# Patient Record
Sex: MALE | Race: WHITE | Employment: OTHER | URBAN - METROPOLITAN AREA
[De-identification: names, ages, dates, MRNs, and addresses within clinical notes are randomized per-mention and may not be internally consistent; named-entity substitution may affect disease eponyms.]

---

## 2017-09-05 ENCOUNTER — HOSPITAL ENCOUNTER (OUTPATIENT)
Dept: LAB | Age: 67
Discharge: HOME | End: 2017-09-05
Payer: MEDICARE

## 2017-09-05 ENCOUNTER — HOSPITAL ENCOUNTER (OUTPATIENT)
Dept: ULTRASOUND IMAGING | Age: 67
Discharge: HOME | End: 2017-09-05
Payer: MEDICARE

## 2017-09-05 DIAGNOSIS — R14.0 ABDOMINAL BLOATING: ICD-10-CM

## 2017-09-05 DIAGNOSIS — R10.11 RUQ PAIN: ICD-10-CM

## 2017-09-05 LAB
ALBUMIN SERPL-MCNC: 4.4 G/DL (ref 3.9–4.9)
ALP BLD-CCNC: 59 U/L (ref 35–104)
ALT SERPL-CCNC: 29 U/L (ref 0–41)
AMYLASE: 62 U/L (ref 28–100)
ANION GAP SERPL CALCULATED.3IONS-SCNC: 18 MEQ/L (ref 7–13)
AST SERPL-CCNC: 27 U/L (ref 0–40)
BILIRUB SERPL-MCNC: 1 MG/DL (ref 0–1.2)
BUN BLDV-MCNC: 20 MG/DL (ref 8–23)
CALCIUM SERPL-MCNC: 9 MG/DL (ref 8.6–10.2)
CHLORIDE BLD-SCNC: 102 MEQ/L (ref 98–107)
CO2: 22 MEQ/L (ref 22–29)
CREAT SERPL-MCNC: 0.67 MG/DL (ref 0.7–1.2)
GFR AFRICAN AMERICAN: >60
GFR NON-AFRICAN AMERICAN: >60
GLOBULIN: 1.9 G/DL (ref 2.3–3.5)
GLUCOSE BLD-MCNC: 53 MG/DL (ref 74–109)
HCT VFR BLD CALC: 47 % (ref 42–52)
HEMOGLOBIN: 15.8 G/DL (ref 14–18)
LIPASE: 34 U/L (ref 13–60)
MCH RBC QN AUTO: 28.4 PG (ref 27–31.3)
MCHC RBC AUTO-ENTMCNC: 33.6 % (ref 33–37)
MCV RBC AUTO: 84.5 FL (ref 80–100)
PDW BLD-RTO: 15.4 % (ref 11.5–14.5)
PLATELET # BLD: 128 K/UL (ref 130–400)
POTASSIUM SERPL-SCNC: 4 MEQ/L (ref 3.5–5.1)
RBC # BLD: 5.56 M/UL (ref 4.7–6.1)
SODIUM BLD-SCNC: 142 MEQ/L (ref 132–144)
T4 FREE: 1.24 NG/DL (ref 0.93–1.7)
TOTAL PROTEIN: 6.3 G/DL (ref 6.4–8.1)
TSH SERPL DL<=0.05 MIU/L-ACNC: 4.12 UIU/ML (ref 0.27–4.2)
WBC # BLD: 7.1 K/UL (ref 4.8–10.8)

## 2017-09-05 PROCEDURE — 85027 COMPLETE CBC AUTOMATED: CPT

## 2017-09-05 PROCEDURE — 84439 ASSAY OF FREE THYROXINE: CPT

## 2017-09-05 PROCEDURE — 84443 ASSAY THYROID STIM HORMONE: CPT

## 2017-09-05 PROCEDURE — 82150 ASSAY OF AMYLASE: CPT

## 2017-09-05 PROCEDURE — 76705 ECHO EXAM OF ABDOMEN: CPT

## 2017-09-05 PROCEDURE — 80053 COMPREHEN METABOLIC PANEL: CPT

## 2017-09-05 PROCEDURE — 83690 ASSAY OF LIPASE: CPT

## 2017-09-26 ENCOUNTER — HOSPITAL ENCOUNTER (OUTPATIENT)
Dept: GENERAL RADIOLOGY | Age: 67
Discharge: HOME | End: 2017-09-26
Payer: MEDICARE

## 2017-09-26 DIAGNOSIS — R05.9 COUGH: ICD-10-CM

## 2017-09-26 PROCEDURE — 71020 XR CHEST STANDARD TWO VW: CPT

## 2017-09-28 ENCOUNTER — OFFICE VISIT (OUTPATIENT)
Dept: PULMONOLOGY | Age: 67
End: 2017-09-28

## 2017-09-28 VITALS
HEART RATE: 75 BPM | TEMPERATURE: 97.8 F | DIASTOLIC BLOOD PRESSURE: 72 MMHG | SYSTOLIC BLOOD PRESSURE: 116 MMHG | HEIGHT: 74 IN | WEIGHT: 272 LBS | OXYGEN SATURATION: 92 % | BODY MASS INDEX: 34.91 KG/M2

## 2017-09-28 DIAGNOSIS — R05.8 UPPER AIRWAY COUGH SYNDROME: ICD-10-CM

## 2017-09-28 DIAGNOSIS — K21.9 GASTROESOPHAGEAL REFLUX DISEASE WITHOUT ESOPHAGITIS: ICD-10-CM

## 2017-09-28 DIAGNOSIS — G47.33 OBSTRUCTIVE SLEEP APNEA SYNDROME: Primary | ICD-10-CM

## 2017-09-28 DIAGNOSIS — R05.3 CHRONIC COUGH: ICD-10-CM

## 2017-09-28 PROCEDURE — 99214 OFFICE O/P EST MOD 30 MIN: CPT | Performed by: PHYSICIAN ASSISTANT

## 2017-09-28 RX ORDER — TRIAMCINOLONE ACETONIDE 55 UG/1
2 SPRAY, METERED NASAL DAILY
COMMUNITY

## 2017-09-28 RX ORDER — OLOPATADINE HYDROCHLORIDE 665 UG/1
2 SPRAY NASAL 2 TIMES DAILY
COMMUNITY

## 2017-09-28 ASSESSMENT — ENCOUNTER SYMPTOMS
RHINORRHEA: 0
WHEEZING: 0
COUGH: 0
SINUS PRESSURE: 1
STRIDOR: 0
HEMOPTYSIS: 0
ABDOMINAL PAIN: 0
SHORTNESS OF BREATH: 1
SORE THROAT: 1
ORTHOPNEA: 0
SPUTUM PRODUCTION: 0
VOMITING: 0
SWOLLEN GLANDS: 0
BACK PAIN: 0

## 2017-09-29 ENCOUNTER — TELEPHONE (OUTPATIENT)
Dept: PULMONOLOGY | Age: 67
End: 2017-09-29

## 2017-09-29 NOTE — TELEPHONE ENCOUNTER
Patient was in to see you on 09/28/2017 and thought you had said that he needed to do some type of pulmonary test but it wasn't ordered. Do you want him to other testing?  Please Advise

## 2017-09-29 NOTE — TELEPHONE ENCOUNTER
Please call patient and let patient know I reviewed with Dr. Giana Bustos. Since PFT's were normal in 2015 there is no absolute need to repeat them at this time, unless the patient would like them repeated I can order.     Kenneth Roberts

## 2017-09-30 ENCOUNTER — HOSPITAL ENCOUNTER (OUTPATIENT)
Dept: NON INVASIVE DIAGNOSTICS | Age: 67
Discharge: HOME | End: 2017-09-30
Payer: MEDICARE

## 2017-09-30 LAB
LV EF: 55 %
LVEF MODALITY: NORMAL

## 2017-09-30 PROCEDURE — 93306 TTE W/DOPPLER COMPLETE: CPT

## 2017-10-03 ENCOUNTER — TELEPHONE (OUTPATIENT)
Dept: PULMONOLOGY | Age: 67
End: 2017-10-03

## 2017-10-03 DIAGNOSIS — R05.3 CHRONIC COUGH: Primary | ICD-10-CM

## 2017-10-03 NOTE — TELEPHONE ENCOUNTER
Spoke with patient, overall ECHO looks good. RVSP was about 22 mm Hg. Consulted with Dr. Manish Fox at this point may be beneficial to also add a CT scan of the chest with contrast and PFT's to further look into the patient's cough and congestion especially when he lays down at night. Patient reports that he is currently out of town but will get all of this set up when he gets home in the next 2-3 weeks and will see me back after. Patient currently denies chest pain, SOB, leg swelling. If symptoms rapidly worsen patient is advised to go to the emergency room. Advised patient to call me with any questions or concerns.        Conclusions   Summary   Normal left ventricle structure and function.   Left ventricular ejection fraction is visually estimated at 55%.   Mild concentric left ventricular hypertrophy.   Normal right ventricle structure and function.   Normal right ventricle systolic pressure.   No evidence of pericardial effusion.   Normal aortic valve structure and function.   Aortic valve sclerosis with mild diffuse calcification with abnormal   coaptation resulting in trivial AI   Normal left atrium.   Normal mitral valve structure and function.   Signature   ----------------------------------------------------------------   Electronically signed by Flor Reynolds MD(Interpreting   physician) on 09/30/2017 04:44 PM   ----------------------------------------------------------------

## 2017-10-18 ENCOUNTER — HOSPITAL ENCOUNTER (OUTPATIENT)
Dept: CT IMAGING | Age: 67
Discharge: HOME | End: 2017-10-18
Payer: MEDICARE

## 2017-10-18 VITALS
DIASTOLIC BLOOD PRESSURE: 95 MMHG | WEIGHT: 265 LBS | HEIGHT: 74 IN | BODY MASS INDEX: 34.01 KG/M2 | RESPIRATION RATE: 16 BRPM | HEART RATE: 66 BPM | SYSTOLIC BLOOD PRESSURE: 155 MMHG

## 2017-10-18 DIAGNOSIS — R05.3 CHRONIC COUGH: ICD-10-CM

## 2017-10-18 PROCEDURE — 71260 CT THORAX DX C+: CPT

## 2017-10-18 PROCEDURE — 6360000004 HC RX CONTRAST MEDICATION: Performed by: PHYSICIAN ASSISTANT

## 2017-10-18 RX ORDER — SODIUM CHLORIDE 0.9 % (FLUSH) 0.9 %
10 SYRINGE (ML) INJECTION ONCE
Status: DISCONTINUED | OUTPATIENT
Start: 2017-10-18 | End: 2017-10-21 | Stop reason: HOSPADM

## 2017-10-18 RX ADMIN — IOPAMIDOL 75 ML: 755 INJECTION, SOLUTION INTRAVENOUS at 09:16

## 2017-10-19 LAB
GFR AFRICAN AMERICAN: >60
GFR NON-AFRICAN AMERICAN: >60
PERFORMED ON: NORMAL
POC CREATININE: 1 MG/DL (ref 0.8–1.3)
POC SAMPLE TYPE: NORMAL

## 2017-10-24 ENCOUNTER — HOSPITAL ENCOUNTER (OUTPATIENT)
Dept: SLEEP CENTER | Age: 67
Discharge: HOME | End: 2017-10-24
Payer: MEDICARE

## 2017-10-24 PROCEDURE — 95810 POLYSOM 6/> YRS 4/> PARAM: CPT

## 2017-10-25 ENCOUNTER — TELEPHONE (OUTPATIENT)
Dept: PULMONOLOGY | Age: 67
End: 2017-10-25

## 2017-10-25 ENCOUNTER — HOSPITAL ENCOUNTER (OUTPATIENT)
Dept: PULMONOLOGY | Age: 67
Discharge: HOME | End: 2017-10-25
Payer: MEDICARE

## 2017-10-25 DIAGNOSIS — R05.3 CHRONIC COUGH: ICD-10-CM

## 2017-10-25 PROCEDURE — 94726 PLETHYSMOGRAPHY LUNG VOLUMES: CPT

## 2017-10-25 PROCEDURE — 94010 BREATHING CAPACITY TEST: CPT

## 2017-10-25 PROCEDURE — 94729 DIFFUSING CAPACITY: CPT

## 2017-10-25 RX ORDER — ALBUTEROL SULFATE 2.5 MG/3ML
2.5 SOLUTION RESPIRATORY (INHALATION) ONCE
Status: DISCONTINUED | OUTPATIENT
Start: 2017-10-25 | End: 2017-10-25

## 2017-10-30 PROCEDURE — 94729 DIFFUSING CAPACITY: CPT | Performed by: INTERNAL MEDICINE

## 2017-10-30 PROCEDURE — 94726 PLETHYSMOGRAPHY LUNG VOLUMES: CPT | Performed by: INTERNAL MEDICINE

## 2017-10-30 PROCEDURE — 94010 BREATHING CAPACITY TEST: CPT | Performed by: INTERNAL MEDICINE

## 2017-10-31 NOTE — PROCEDURES
[address]                     [address], [address] [facility]                              PULMONARY FUNCTION    PATIENT NAME: Miguel A Syed              :             1950  MED REC NO:   [id number]                           ROOM:  ACCOUNT NO:   [de-identified]                          ADMISSION DATE:  10/25/2017  PROVIDER:     Mally Dorsey MD    DATE OF PROCEDURE:  10/25/2017    REASON FOR STUDY:  Shortness of breath, cough, and intermittent  wheezing. INTERPRETATION:  FVC is 4.61, 96% of predicted. FEV1 is 3.70, 105% of  predicted. FEV1/FVC is 80%. FEF 25-75% is 3.26, 121% of predicted. Lung volume study shows residual volume is 3.05, 119% of predicted. TLC is 7.65, 102% of predicted. Diffusion capacity is 32.22, 129% of  predicted. Airway resistance is 1.06, 73% of predicted. SUMMARY:  Normal spirometry. Static lung volume is within normal  range. Diffusion capacity is well over normal limit. Airway  resistance is mildly decreased. Flow volume loop is not suggestive of  obstructive or restrictive pulmonary disease. Clinical correlation is  requested.         Lizett Jalloh MD    D: 10/30/2017 20:41:49       T: 10/30/2017 23:14:22     [id number]  Job#: [id number]     Doc#: [id number]

## 2017-11-01 ENCOUNTER — TELEPHONE (OUTPATIENT)
Dept: PULMONOLOGY | Age: 67
End: 2017-11-01

## 2017-11-01 DIAGNOSIS — G47.33 OSA (OBSTRUCTIVE SLEEP APNEA): Primary | ICD-10-CM

## 2017-11-01 NOTE — TELEPHONE ENCOUNTER
Spoke with patient regarding final test results. PFT's were normal. Sleep study did show moderate AHI of 28.7 with a RDI of 30.6. Based on this they would advise a titration study. Patient is agreeable. Will order titration and see patient for follow up in the office after that.     Ana M Lobo

## 2017-11-06 ENCOUNTER — HOSPITAL ENCOUNTER (OUTPATIENT)
Dept: SLEEP CENTER | Age: 67
Discharge: HOME | End: 2017-11-06
Payer: MEDICARE

## 2017-11-06 PROCEDURE — 95811 POLYSOM 6/>YRS CPAP 4/> PARM: CPT

## 2017-11-15 ENCOUNTER — OFFICE VISIT (OUTPATIENT)
Dept: PULMONOLOGY | Age: 67
End: 2017-11-15

## 2017-11-15 VITALS
WEIGHT: 271 LBS | HEIGHT: 74 IN | SYSTOLIC BLOOD PRESSURE: 124 MMHG | BODY MASS INDEX: 34.78 KG/M2 | TEMPERATURE: 96.4 F | OXYGEN SATURATION: 98 % | DIASTOLIC BLOOD PRESSURE: 62 MMHG | HEART RATE: 70 BPM

## 2017-11-15 DIAGNOSIS — R05.8 UPPER AIRWAY COUGH SYNDROME: ICD-10-CM

## 2017-11-15 DIAGNOSIS — G47.33 OSA (OBSTRUCTIVE SLEEP APNEA): Primary | ICD-10-CM

## 2017-11-15 DIAGNOSIS — K21.9 GASTROESOPHAGEAL REFLUX DISEASE WITHOUT ESOPHAGITIS: ICD-10-CM

## 2017-11-15 DIAGNOSIS — R05.3 CHRONIC COUGH: ICD-10-CM

## 2017-11-15 PROCEDURE — 4040F PNEUMOC VAC/ADMIN/RCVD: CPT | Performed by: PHYSICIAN ASSISTANT

## 2017-11-15 PROCEDURE — 1036F TOBACCO NON-USER: CPT | Performed by: PHYSICIAN ASSISTANT

## 2017-11-15 PROCEDURE — G8427 DOCREV CUR MEDS BY ELIG CLIN: HCPCS | Performed by: PHYSICIAN ASSISTANT

## 2017-11-15 PROCEDURE — G8484 FLU IMMUNIZE NO ADMIN: HCPCS | Performed by: PHYSICIAN ASSISTANT

## 2017-11-15 PROCEDURE — 1123F ACP DISCUSS/DSCN MKR DOCD: CPT | Performed by: PHYSICIAN ASSISTANT

## 2017-11-15 PROCEDURE — G8417 CALC BMI ABV UP PARAM F/U: HCPCS | Performed by: PHYSICIAN ASSISTANT

## 2017-11-15 PROCEDURE — 99214 OFFICE O/P EST MOD 30 MIN: CPT | Performed by: PHYSICIAN ASSISTANT

## 2017-11-15 PROCEDURE — 3017F COLORECTAL CA SCREEN DOC REV: CPT | Performed by: PHYSICIAN ASSISTANT

## 2017-11-15 RX ORDER — NICOTINE 14MG/24HR
175 PATCH, TRANSDERMAL 24 HOURS TRANSDERMAL
COMMUNITY

## 2017-11-15 RX ORDER — PROPAFENONE HYDROCHLORIDE 150 MG/1
150 TABLET, FILM COATED ORAL EVERY 8 HOURS
COMMUNITY

## 2017-11-15 ASSESSMENT — ENCOUNTER SYMPTOMS
COUGH: 1
SORE THROAT: 1
ABDOMINAL PAIN: 0
SHORTNESS OF BREATH: 1
WHEEZING: 0
BACK PAIN: 0

## 2017-11-15 NOTE — PROGRESS NOTES
according to him. I advised it also may be beneficial for him to touch base with his primary care provider as he may need a referral to a GI specialist.    No red flags found on exam today. Discussed with patient red flag symptoms and advised ER evaluation if symptoms were to worsen. Reviewed with the patient: current clinical status, medications, activities and diet. Side effects, adverse effects of the medication prescribed today, as well as treatment plan and result expectations have been discussed with the patient who expresses understanding and desires to proceed. Close follow up to evaluate treatment results and for coordination of care. I have reviewed the patient's medical history in detail and updated the computerized patient record. Ideally I would like patient to follow up in 4-6 weeks. Patient states that he is unable to do this as a live in [place] for 6 months of the year throughout winter. He states that he will see a pulmonologist and other providers down there if he needs to and will follow up here in the spring for a sleep apnea follow-up.     Kamila Orta PA-C